# Patient Record
Sex: FEMALE | Race: WHITE | NOT HISPANIC OR LATINO | Employment: FULL TIME | URBAN - METROPOLITAN AREA
[De-identification: names, ages, dates, MRNs, and addresses within clinical notes are randomized per-mention and may not be internally consistent; named-entity substitution may affect disease eponyms.]

---

## 2023-03-14 ENCOUNTER — APPOINTMENT (OUTPATIENT)
Dept: GENERAL RADIOLOGY | Facility: CLINIC | Age: 78
End: 2023-03-14
Attending: EMERGENCY MEDICINE
Payer: MEDICARE

## 2023-03-14 ENCOUNTER — HOSPITAL ENCOUNTER (EMERGENCY)
Facility: CLINIC | Age: 78
Discharge: HOME | End: 2023-03-14
Attending: EMERGENCY MEDICINE | Admitting: EMERGENCY MEDICINE
Payer: MEDICARE

## 2023-03-14 VITALS
SYSTOLIC BLOOD PRESSURE: 167 MMHG | DIASTOLIC BLOOD PRESSURE: 89 MMHG | BODY MASS INDEX: 27.32 KG/M2 | TEMPERATURE: 98.2 F | OXYGEN SATURATION: 98 % | HEART RATE: 85 BPM | HEIGHT: 65 IN | RESPIRATION RATE: 16 BRPM | WEIGHT: 164 LBS

## 2023-03-14 DIAGNOSIS — R07.9 CHEST PAIN, UNSPECIFIED TYPE: ICD-10-CM

## 2023-03-14 LAB
ALBUMIN SERPL BCG-MCNC: 4.5 G/DL (ref 3.5–5.2)
ALP SERPL-CCNC: 65 U/L (ref 35–104)
ALT SERPL W P-5'-P-CCNC: 15 U/L (ref 10–35)
ANION GAP SERPL CALCULATED.3IONS-SCNC: 14 MMOL/L (ref 7–15)
AST SERPL W P-5'-P-CCNC: 21 U/L (ref 10–35)
ATRIAL RATE - MUSE: 80 BPM
BASOPHILS # BLD AUTO: 0.1 10E3/UL (ref 0–0.2)
BASOPHILS NFR BLD AUTO: 1 %
BILIRUB DIRECT SERPL-MCNC: <0.2 MG/DL (ref 0–0.3)
BILIRUB SERPL-MCNC: 0.6 MG/DL
BUN SERPL-MCNC: 13.8 MG/DL (ref 8–23)
CALCIUM SERPL-MCNC: 10.3 MG/DL (ref 8.8–10.2)
CHLORIDE SERPL-SCNC: 95 MMOL/L (ref 98–107)
CREAT SERPL-MCNC: 0.93 MG/DL (ref 0.51–0.95)
D DIMER PPP FEU-MCNC: <0.27 UG/ML FEU (ref 0–0.5)
DEPRECATED HCO3 PLAS-SCNC: 26 MMOL/L (ref 22–29)
DIASTOLIC BLOOD PRESSURE - MUSE: NORMAL MMHG
EOSINOPHIL # BLD AUTO: 0.1 10E3/UL (ref 0–0.7)
EOSINOPHIL NFR BLD AUTO: 1 %
ERYTHROCYTE [DISTWIDTH] IN BLOOD BY AUTOMATED COUNT: 11.9 % (ref 10–15)
GFR SERPL CREATININE-BSD FRML MDRD: 63 ML/MIN/1.73M2
GLUCOSE SERPL-MCNC: 143 MG/DL (ref 70–99)
HCT VFR BLD AUTO: 37.1 % (ref 35–47)
HGB BLD-MCNC: 12.6 G/DL (ref 11.7–15.7)
HOLD SPECIMEN: NORMAL
IMM GRANULOCYTES # BLD: 0 10E3/UL
IMM GRANULOCYTES NFR BLD: 0 %
INTERPRETATION ECG - MUSE: NORMAL
LYMPHOCYTES # BLD AUTO: 1.3 10E3/UL (ref 0.8–5.3)
LYMPHOCYTES NFR BLD AUTO: 22 %
MCH RBC QN AUTO: 32.5 PG (ref 26.5–33)
MCHC RBC AUTO-ENTMCNC: 34 G/DL (ref 31.5–36.5)
MCV RBC AUTO: 96 FL (ref 78–100)
MONOCYTES # BLD AUTO: 0.6 10E3/UL (ref 0–1.3)
MONOCYTES NFR BLD AUTO: 10 %
NEUTROPHILS # BLD AUTO: 3.9 10E3/UL (ref 1.6–8.3)
NEUTROPHILS NFR BLD AUTO: 66 %
NRBC # BLD AUTO: 0 10E3/UL
NRBC BLD AUTO-RTO: 0 /100
P AXIS - MUSE: 71 DEGREES
PLATELET # BLD AUTO: 278 10E3/UL (ref 150–450)
POTASSIUM SERPL-SCNC: 3.9 MMOL/L (ref 3.4–5.3)
PR INTERVAL - MUSE: 154 MS
PROT SERPL-MCNC: 7.2 G/DL (ref 6.4–8.3)
QRS DURATION - MUSE: 68 MS
QT - MUSE: 392 MS
QTC - MUSE: 452 MS
R AXIS - MUSE: 11 DEGREES
RBC # BLD AUTO: 3.88 10E6/UL (ref 3.8–5.2)
SODIUM SERPL-SCNC: 135 MMOL/L (ref 136–145)
SYSTOLIC BLOOD PRESSURE - MUSE: NORMAL MMHG
T AXIS - MUSE: 47 DEGREES
TROPONIN T SERPL HS-MCNC: 11 NG/L
TROPONIN T SERPL HS-MCNC: 11 NG/L
VENTRICULAR RATE- MUSE: 80 BPM
WBC # BLD AUTO: 5.8 10E3/UL (ref 4–11)

## 2023-03-14 PROCEDURE — 82310 ASSAY OF CALCIUM: CPT | Performed by: EMERGENCY MEDICINE

## 2023-03-14 PROCEDURE — 71046 X-RAY EXAM CHEST 2 VIEWS: CPT

## 2023-03-14 PROCEDURE — 84484 ASSAY OF TROPONIN QUANT: CPT | Mod: 91 | Performed by: EMERGENCY MEDICINE

## 2023-03-14 PROCEDURE — 99285 EMERGENCY DEPT VISIT HI MDM: CPT | Mod: 25

## 2023-03-14 PROCEDURE — 36415 COLL VENOUS BLD VENIPUNCTURE: CPT | Performed by: EMERGENCY MEDICINE

## 2023-03-14 PROCEDURE — 93005 ELECTROCARDIOGRAM TRACING: CPT

## 2023-03-14 PROCEDURE — 82248 BILIRUBIN DIRECT: CPT | Performed by: EMERGENCY MEDICINE

## 2023-03-14 PROCEDURE — 84484 ASSAY OF TROPONIN QUANT: CPT | Performed by: EMERGENCY MEDICINE

## 2023-03-14 PROCEDURE — 85379 FIBRIN DEGRADATION QUANT: CPT | Performed by: EMERGENCY MEDICINE

## 2023-03-14 PROCEDURE — 85025 COMPLETE CBC W/AUTO DIFF WBC: CPT | Performed by: EMERGENCY MEDICINE

## 2023-03-14 ASSESSMENT — ACTIVITIES OF DAILY LIVING (ADL)
ADLS_ACUITY_SCORE: 35

## 2023-03-14 NOTE — ED TRIAGE NOTES
Left chest pain with left arm pain and tingling since yesterday, been under high level stress. Plan to visit [facility] tomorrow, wanted to check things out.      Triage Assessment     Row Name 03/14/23 3817       Triage Assessment (Adult)    Airway WDL WDL       Respiratory WDL    Respiratory WDL WDL       Skin Circulation/Temperature WDL    Skin Circulation/Temperature WDL WDL       Cardiac WDL    Cardiac WDL X       Peripheral/Neurovascular WDL    Peripheral Neurovascular WDL WDL       Cognitive/Neuro/Behavioral WDL    Cognitive/Neuro/Behavioral WDL WDL

## 2023-03-14 NOTE — LETTER
March 14, 2023      To Whom It May Concern:      Gale Hewitt was seen in our Emergency Department today, 03/14/23.  She should not travel or fly due to her current condition.    Sincerely,        Vik Suh MD

## 2023-03-14 NOTE — ED NOTES
Tele-PIT/Intake Evaluation      Video-Visit Details    Type of service:  Video Visit    Video Start Time (time video started): 1331  Video End Time (time video stopped): 1333  Originating Location (pt. Location): [facility] ED  Distant Location (provider location):  Offsite   Mode of Communication:  Video Conference via [employer]  Patient verbally consented to [employer] televisit.    History:  Several days of left-sided chest and shoulder pain, dyspnea.  History of aortic stenosis  Exam:  Normal perfusion and unlabored breathing    Appropriate interventions for symptom management were initiated if applicable.  Appropriate diagnostic tests were initiated if indicated.    MDM:  Symptoms concerning for possible ACS, PE, or worsening aortic stenosis.    I briefly evaluated the patient and developed an initial plan of care. I discussed this plan and explained that this brief interaction does not constitute a full evaluation. Patient/family understands that they should wait to be fully evaluated and discuss any test results with another clinician prior to leaving the hospital.         Goran Manning MD  03/14/23 [id number]

## 2023-03-15 NOTE — ED PROVIDER NOTES
History     Chief Complaint:  Chest Pain and Arm Pain       The history is provided by the patient.      Gale Hewitt is a 78 year old female with a history of bicuspid aortic valve, heart murmur, hypertension, hyperlipidemia, pre-diabetes, among others who presents with chest pain and arm tingling that has been ongoing for the last week intermittently. However, the patient reports that her pain and tingling have been constant today, which prompted her to come into the ED. She describes her pain as tightness.  She does feel slightly lightheaded, but she denies feeling short of breath or having a cough or fever.  She reports that she has been under a lot of stress for the last 13 months, which has caused her occasional shortness of breath and lightheadedness.  She denies decreased appetite, abdominal pain, nausea, vomiting, leg swelling, or blood thinner usage. The patient notes that she has an echocardiogram scheduled for May 10. She has never been seen for these symptoms before.  She wanted to get checked out, as she is supposed to go to [place] tomorrow.    Independent Historian:   None - Patient Only    Review of External Notes: Cardiology clinic note from 5/10/2022 and primary care clinic note from 2/28/2023    ROS:  Review of Systems   Negative besides those mentioned in the HPI.    Allergies:  The patient has no known allergies.     Medications:    Aspirin 81 mg  Hyzaar  Metoprolol Tartrate  Simvastatin  Prilosec  Crestor  Zocor    Past Medical History:    Aortic stenosis with bicuspid valve  Schatzki's ring  Dysphagia  Elevated fasting blood sugar  Ascending aorta dilatation  Hypertension   Hyperlipidemia  Disorder of bone and cartilage  Glaucoma  Bilateral cataracts  Anxiety  Pre-diabetes  Cataract  Heart murmur    Past Surgical History:    Colonoscopy  Appendectomy  Hysterectomy  Cataract extraction, IOL    Family History:    Brother(s): atrial fibrillation, prostate cancer,  "hypertension  Father: myocardial infarction  Mother: myocardial infarction   Sister(s): cataracts, hypertension, thyroid disease.    Social History:  The patient presents to the ED alone.  The patient has a trip to [place] planned for tomorrow.  PCP: Joshua Reilly     Physical Exam     Patient Vitals for the past 24 hrs:   BP Temp Temp src Pulse Resp SpO2 Height Weight   03/14/23 2000 (!) 167/89 -- -- 85 -- 98 % -- --   03/14/23 1400 (!) 169/100 98.2  F (36.8  C) Temporal 81 16 98 % 1.651 m (5' 5\") 74.4 kg (164 lb)        Physical Exam  Nursing note and vitals reviewed.  Constitutional:  Oriented to person, place, and time. Cooperative.   HENT:   Nose:    Nose normal.   Mouth/Throat:   Facemask in place.   Eyes:    Conjunctivae normal and EOM are normal.      Pupils are equal, round, and reactive to light.   Neck:    Trachea normal.   Cardiovascular:  Normal rate, regular rhythm, normal heart sounds and normal pulses. 2/6 systolic murmur heard.  Pulmonary/Chest:  Effort normal and breath sounds normal.   Abdominal:   Soft. Normal appearance and bowel sounds are normal.      There is no tenderness.      There is no rebound and no CVA tenderness.   Musculoskeletal:  Extremities atraumatic x 4.   Lymphadenopathy:  No cervical adenopathy.   Neurological:   Alert and oriented to person, place, and time. Normal strength.      No cranial nerve deficit or sensory deficit. GCS eye subscore is 4. GCS verbal subscore is 5. GCS motor subscore is 6.   Skin:    Skin is intact. No rash noted.   Psychiatric:   Normal mood and affect.    Emergency Department Course   ECG  ECG taken at 1315, ECG read at 2007  Normal sinus rhythm  Low voltage QRS  Cannot rule out Anterior infarct, age undetermined  Abnormal ECG   Rate 80 bpm. VA interval 154 ms. QRS duration 68 ms. QT/QTc 392/452 ms. P-R-T axes 71 11 47.     Imaging:  XR Chest 2 Views   Final Result   IMPRESSION: Heart size is normal. No pleural effusion, pneumothorax, or " abnormal area of consolidation. Lucency noted in a retrocardiac position, suggestive of hiatal hernia.         Report per radiology    Laboratory:  Labs Ordered and Resulted from Time of ED Arrival to Time of ED Departure   BASIC METABOLIC PANEL - Abnormal       Result Value    Sodium 135 (*)     Potassium 3.9      Chloride 95 (*)     Carbon Dioxide (CO2) 26      Anion Gap 14      Urea Nitrogen 13.8      Creatinine 0.93      Calcium 10.3 (*)     Glucose 143 (*)     GFR Estimate 63     TROPONIN T, HIGH SENSITIVITY - Normal    Troponin T, High Sensitivity 11     D DIMER QUANTITATIVE - Normal    D-Dimer Quantitative <0.27     TROPONIN T, HIGH SENSITIVITY - Normal    Troponin T, High Sensitivity 11     HEPATIC FUNCTION PANEL - Normal    Protein Total 7.2      Albumin 4.5      Bilirubin Total 0.6      Alkaline Phosphatase 65      AST 21      ALT 15      Bilirubin Direct <0.20     CBC WITH PLATELETS AND DIFFERENTIAL    WBC Count 5.8      RBC Count 3.88      Hemoglobin 12.6      Hematocrit 37.1      MCV 96      MCH 32.5      MCHC 34.0      RDW 11.9      Platelet Count 278      % Neutrophils 66      % Lymphocytes 22      % Monocytes 10      % Eosinophils 1      % Basophils 1      % Immature Granulocytes 0      NRBCs per 100 WBC 0      Absolute Neutrophils 3.9      Absolute Lymphocytes 1.3      Absolute Monocytes 0.6      Absolute Eosinophils 0.1      Absolute Basophils 0.1      Absolute Immature Granulocytes 0.0      Absolute NRBCs 0.0        Emergency Department Course & Assessments:       Assessments:  2007 I obtained history and examined the patient as noted above.    Independent Interpretation (X-rays, CTs, rhythm strip):  I independently viewed X-ray images and agree with the radiologist with regard to no infiltrate or pulmonary edema.     Social Determinants of Health affecting care:   None    Disposition:  The patient was discharged to home.     Impression & Plan      Medical Decision Making:  This is a 78-year-old  female who came in for further evaluation of chest pain.  Her pain has been constant today, and therefore it is reassuring that she has a normal EKG and 2 negative troponins.  She also had a negative D-dimer and negative chest x-ray.  I feel that it is unlikely that this is from cardiac ischemia, and I feel that she is safe for discharge and outpatient management.  I stressed the importance of close outpatient follow-up with her cardiologist and primary care.  She knows to return here or to [facility] with any concerns or worsening symptoms.  I also agreed to provide her a note for the airline, as I feel that it is likely not appropriate for her to fly to [place] tomorrow.    Diagnosis:    ICD-10-CM    1. Chest pain, unspecified type  R07.9          Scribe Disclosure:  I, Galileo Volchayo, am serving as a scribe at 10:09 PM on 3/14/2023 to document services personally performed by Vik uSh MD, based on my observations and the provider's statements to me.   3/14/2023   Vik Suh MD Lashkowitz, Seth H, MD  03/14/23 [id number]       Vik Suh MD  03/14/23 9206